# Patient Record
Sex: MALE | Race: WHITE | NOT HISPANIC OR LATINO
[De-identification: names, ages, dates, MRNs, and addresses within clinical notes are randomized per-mention and may not be internally consistent; named-entity substitution may affect disease eponyms.]

---

## 2017-02-26 PROBLEM — Z00.00 ENCOUNTER FOR PREVENTIVE HEALTH EXAMINATION: Status: ACTIVE | Noted: 2017-02-26

## 2017-04-05 ENCOUNTER — APPOINTMENT (OUTPATIENT)
Dept: UROLOGY | Facility: CLINIC | Age: 67
End: 2017-04-05

## 2020-03-13 ENCOUNTER — APPOINTMENT (OUTPATIENT)
Dept: UROLOGY | Facility: CLINIC | Age: 70
End: 2020-03-13
Payer: MEDICARE

## 2020-03-13 VITALS
WEIGHT: 210 LBS | HEART RATE: 108 BPM | HEIGHT: 67 IN | SYSTOLIC BLOOD PRESSURE: 139 MMHG | BODY MASS INDEX: 32.96 KG/M2 | DIASTOLIC BLOOD PRESSURE: 88 MMHG

## 2020-03-13 DIAGNOSIS — Z78.9 OTHER SPECIFIED HEALTH STATUS: ICD-10-CM

## 2020-03-13 DIAGNOSIS — K46.9 UNSPECIFIED ABDOMINAL HERNIA W/OUT OBSTRUCTION OR GANGRENE: ICD-10-CM

## 2020-03-13 PROCEDURE — 52000 CYSTOURETHROSCOPY: CPT

## 2020-03-13 PROCEDURE — 99215 OFFICE O/P EST HI 40 MIN: CPT | Mod: 25

## 2020-03-13 PROCEDURE — 51700 IRRIGATION OF BLADDER: CPT | Mod: 59

## 2020-03-13 PROCEDURE — 51798 US URINE CAPACITY MEASURE: CPT

## 2020-03-13 NOTE — ASSESSMENT
[FreeTextEntry1] : Physical exam shows that his resting tremor is worse. His bladder is palpable penis without discharge but there is blood on his depends testicular exam was soft and nontender. Bladder scan showed over 500 ML I tried to pass first 20 [place] than a 14 [place] coudé catheter met resistance in the region of the bladder neck. I don’t know if this is a false passage she did with his catheter or he has bladder neck contracture so we elected to go to cystoscopy please see that note for specifics.\par \par \par \par Cystoscopy showed a false passage has well bladder neck contracture under vision with difficulty I got by the false passage is able to get the scope into the bladder up a wire down. I taper the end of a coudé catheter and over the wire is able to force it in getting in a 22 feeling it dilate the tissue. I.e. the inflatable and took out the wire and to get about 600 mL of grossly blood urine. He was irrigated until cleared taking about a liter to do that and he was left with a catheter to a leg back.\par \par It’s going to go home will see where we does over the weekend I’ve explained to my partner who is covering for the week and what happened and that there’s a problem we may have to admit him. I will see him on Monday and discuss further options including checking his prostate. He will probably repeat cystoscopy though this may be done in an operating room in case I feel I need to do an incision of his bladder neck and/or section of his prostate.\par

## 2020-03-13 NOTE — LETTER BODY
[Dear  ___] : Dear  [unfilled], [Consult Letter:] : I had the pleasure of evaluating your patient, [unfilled]. [Please see my note below.] : Please see my note below. [Consult Closing:] : Thank you very much for allowing me to participate in the care of this patient.  If you have any questions, please do not hesitate to contact me. [Sincerely,] : Sincerely, [FreeTextEntry2] : Davon Zarate MD\par [address]par [place] [address]\par

## 2020-03-13 NOTE — PHYSICAL EXAM
[General Appearance - Well Developed] : well developed [General Appearance - Well Nourished] : well nourished [Normal Appearance] : normal appearance [Well Groomed] : well groomed [Heart Rate And Rhythm] : Heart rate and rhythm were normal [Respiration, Rhythm And Depth] : normal respiratory rhythm and effort [Exaggerated Use Of Accessory Muscles For Inspiration] : no accessory muscle use [Auscultation Breath Sounds / Voice Sounds] : lungs were clear to auscultation bilaterally [Abdomen Soft] : soft [Abdomen Tenderness] : non-tender [Abdomen Hernia] : no hernia was discovered [Costovertebral Angle Tenderness] : no ~M costovertebral angle tenderness [Urethral Meatus] : meatus normal [] : no rash [Oriented To Time, Place, And Person] : oriented to person, place, and time [Affect] : the affect was normal [Mood] : the mood was normal [Not Anxious] : not anxious [FreeTextEntry1] : cant explain not calling until now other than wife with serious illness and he has been ignoring all else

## 2020-03-13 NOTE — END OF VISIT
[>50% of Time Spent on Counseling for ____] : Greater than 50% of the encounter time was spent on counseling for [unfilled] [Time Spent: ___ minutes] : I have spent [unfilled] minutes of face to face time with the patient EP study with possible intervention/Other (specify)

## 2020-03-13 NOTE — HISTORY OF PRESENT ILLNESS
[FreeTextEntry1] : Nam was last seen April 5, 2017 just under three years ago. At that point he was voiding well using sildenafil his testosterone was normal though the free and bioavailable were low normal and he was supposed to come back as if needed. 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 Wood he felt it could be watched. In that going for a greenlight laser in July 2012 and was voiding well since then. At the time of the laser he was told that there may be some sloughing for the 6 to 12 weeks afterwards and he’d had that and then he done well. Sometime the end of December he voided out some sort of particular matter but then started voiding at his baseline two weeks ago he had trouble voiding pushed out what he says was a small piece of particulate matter he again said he then started voiding well and a few days ago he started having increasing trouble urinating. He had tried putting in a catheter got out some particulate matter and said he was able to void he tried putting in the catheter today and got out nothing. He called got service he was referred to Dr. Wood who referred the call to me as I was coming in I contacted him and told him to come in. [Urinary Retention] : urinary retention

## 2020-03-13 NOTE — LETTER HEADER
[FreeTextEntry3] : Chuyita Daly M.D.\par Director of Urology\par [facility]\par [address] [place] [address], [address]\[place] [address]

## 2020-03-16 ENCOUNTER — APPOINTMENT (OUTPATIENT)
Dept: UROLOGY | Facility: CLINIC | Age: 70
End: 2020-03-16
Payer: MEDICARE

## 2020-03-16 VITALS
BODY MASS INDEX: 32.96 KG/M2 | HEIGHT: 67 IN | TEMPERATURE: 98.3 F | DIASTOLIC BLOOD PRESSURE: 74 MMHG | WEIGHT: 210 LBS | SYSTOLIC BLOOD PRESSURE: 126 MMHG | HEART RATE: 104 BPM

## 2020-03-16 PROCEDURE — 99215 OFFICE O/P EST HI 40 MIN: CPT | Mod: 25

## 2020-03-16 PROCEDURE — 51700 IRRIGATION OF BLADDER: CPT

## 2020-03-16 PROCEDURE — 81003 URINALYSIS AUTO W/O SCOPE: CPT | Mod: QW

## 2020-03-16 NOTE — LETTER BODY
[Dear  ___] : Dear  [unfilled], [Courtesy Letter:] : I had the pleasure of seeing your patient, [unfilled], in my office today. [Please see my note below.] : Please see my note below. [Sincerely,] : Sincerely, [FreeTextEntry2] : Davon Zarate MD\par [address]par [place] [address]\par

## 2020-03-16 NOTE — ASSESSMENT
[FreeTextEntry1] : Physical exam showed some irritation around the urethra but no signs of infection testicular exam was benign and the urine the clear.\par \par The bladder was irrigated a few tiny clots were removed with the ring it was clear. Please note though he held over 500 mL on Friday trying to put in more than 50 mL led to him having spasms with your leaking out around the catheter.\par \par The catheter was removed was sent outside (because of corona we are not having people sit in the office) and came back a short time later. He voided in his pants with severe urge incontinence. We scanned him and he had 75 mL in his bladder. We had him wait at this point he was able to control well enough to give us a sample we will send it for urinalysis and culture. We sukhdev a BMP and CBC and when the results come back Wednesday we will decide if he can get a CT urogram to see if his bleeding was from something else or UTI. If he has trouble voiding he will contact us if he’s able to void well enough will see what the CT urogram shows and then decide if this is new one set of bladder outlet obstruction several years post a laser prostatectomy or this is something else

## 2020-03-16 NOTE — LETTER HEADER
[FreeTextEntry3] : Chuyita Daly M.D.\par Director of Urology\par [facility]\par [address] [place] [address], [address]\[place] [address]

## 2020-03-16 NOTE — HISTORY OF PRESENT ILLNESS
[FreeTextEntry1] : Nam was seen on Friday is an emergency appointment. He was to be retention with clots with a false passage in the urethra. With difficulty using a flexible cystoscope and wire catheter was put in and manipulated into the bladder. Over the week and he seen a few clots but the urine has been clearing. He has been having some spasms with urine leaking out around the catheter

## 2020-03-18 LAB
ANION GAP SERPL CALC-SCNC: 15 MMOL/L
BASOPHILS # BLD AUTO: 0.04 K/UL
BASOPHILS NFR BLD AUTO: 0.2 %
BUN SERPL-MCNC: 15 MG/DL
CALCIUM SERPL-MCNC: 9.1 MG/DL
CHLORIDE SERPL-SCNC: 97 MMOL/L
CO2 SERPL-SCNC: 23 MMOL/L
CREAT SERPL-MCNC: 0.7 MG/DL
EOSINOPHIL # BLD AUTO: 0.02 K/UL
EOSINOPHIL NFR BLD AUTO: 0.1 %
GLUCOSE SERPL-MCNC: 79 MG/DL
HCT VFR BLD CALC: 45.2 %
HGB BLD-MCNC: 14.6 G/DL
IMM GRANULOCYTES NFR BLD AUTO: 0.3 %
LYMPHOCYTES # BLD AUTO: 1.47 K/UL
LYMPHOCYTES NFR BLD AUTO: 7.2 %
MAN DIFF?: NORMAL
MCHC RBC-ENTMCNC: 31.1 PG
MCHC RBC-ENTMCNC: 32.3 G/DL
MCV RBC AUTO: 96.4 FL
MONOCYTES # BLD AUTO: 1.41 K/UL
MONOCYTES NFR BLD AUTO: 6.9 %
NEUTROPHILS # BLD AUTO: 17.32 K/UL
NEUTROPHILS NFR BLD AUTO: 85.3 %
PLATELET # BLD AUTO: 238 K/UL
POTASSIUM SERPL-SCNC: 4.5 MMOL/L
RBC # BLD: 4.69 M/UL
RBC # FLD: 13.5 %
SODIUM SERPL-SCNC: 135 MMOL/L
WBC # FLD AUTO: 20.33 K/UL

## 2020-05-27 ENCOUNTER — APPOINTMENT (OUTPATIENT)
Dept: UROLOGY | Facility: CLINIC | Age: 70
End: 2020-05-27
Payer: MEDICARE

## 2020-05-27 VITALS
WEIGHT: 215 LBS | HEIGHT: 67 IN | DIASTOLIC BLOOD PRESSURE: 72 MMHG | BODY MASS INDEX: 33.74 KG/M2 | HEART RATE: 82 BPM | SYSTOLIC BLOOD PRESSURE: 126 MMHG | TEMPERATURE: 96.5 F

## 2020-05-27 DIAGNOSIS — Z87.448 PERSONAL HISTORY OF OTHER DISEASES OF URINARY SYSTEM: ICD-10-CM

## 2020-05-27 DIAGNOSIS — R33.8 OTHER RETENTION OF URINE: ICD-10-CM

## 2020-05-27 DIAGNOSIS — N36.5 URETHRAL FALSE PASSAGE: ICD-10-CM

## 2020-05-27 DIAGNOSIS — N32.0 BLADDER-NECK OBSTRUCTION: ICD-10-CM

## 2020-05-27 PROCEDURE — 99214 OFFICE O/P EST MOD 30 MIN: CPT

## 2020-05-27 RX ORDER — CEFUROXIME AXETIL 500 MG/1
500 TABLET ORAL
Qty: 14 | Refills: 0 | Status: COMPLETED | COMMUNITY
Start: 2020-03-20 | End: 2020-05-27

## 2020-05-28 LAB
APPEARANCE: CLEAR
BILIRUBIN URINE: NEGATIVE
BLOOD URINE: NEGATIVE
COLOR: COLORLESS
GLUCOSE QUALITATIVE U: NEGATIVE
KETONES URINE: NEGATIVE
LEUKOCYTE ESTERASE URINE: NEGATIVE
NITRITE URINE: NEGATIVE
PH URINE: 7.5
PROTEIN URINE: NEGATIVE
SPECIFIC GRAVITY URINE: 1.01
URINE CYTOLOGY: NORMAL
UROBILINOGEN URINE: NORMAL

## 2020-05-31 LAB — BACTERIA UR CULT: NORMAL

## 2020-06-02 PROBLEM — N32.0 BLADDER NECK CONTRACTURE: Status: RESOLVED | Noted: 2020-03-13 | Resolved: 2020-06-02

## 2020-06-02 PROBLEM — N36.5 FALSE PASSAGE OF URETHRA: Status: RESOLVED | Noted: 2020-03-13 | Resolved: 2020-06-02

## 2020-06-02 PROBLEM — R33.8 ACUTE URINARY RETENTION: Status: RESOLVED | Noted: 2020-03-13 | Resolved: 2020-06-02

## 2020-06-02 NOTE — LETTER BODY
[Courtesy Letter:] : I had the pleasure of seeing your patient, [unfilled], in my office today. [Dear  ___] : Dear  [unfilled], [Please see my note below.] : Please see my note below. [Sincerely,] : Sincerely, [FreeTextEntry2] : Davon Zarate MD\par [address]par [place] [address]\par

## 2020-06-02 NOTE — PHYSICAL EXAM
[General Appearance - Well Developed] : well developed [Normal Appearance] : normal appearance [General Appearance - Well Nourished] : well nourished [Well Groomed] : well groomed [Abdomen Soft] : soft [Abdomen Tenderness] : non-tender [Costovertebral Angle Tenderness] : no ~M costovertebral angle tenderness [Abdomen Hernia] : no hernia was discovered [Penis Abnormality] : normal circumcised penis [Urethral Meatus] : meatus normal [Testes Mass (___cm)] : there were no testicular masses [Testes Tenderness] : no tenderness of the testes [Heart Rate And Rhythm] : Heart rate and rhythm were normal [Auscultation Breath Sounds / Voice Sounds] : lungs were clear to auscultation bilaterally [Respiration, Rhythm And Depth] : normal respiratory rhythm and effort [] : no respiratory distress [Exaggerated Use Of Accessory Muscles For Inspiration] : no accessory muscle use [Inguinal Lymph Nodes Enlarged Bilaterally] : inguinal [FreeTextEntry1] : resting tremors

## 2020-06-02 NOTE — HISTORY OF PRESENT ILLNESS
[FreeTextEntry1] : Mr. Armenta has been seen back on March 16 with retention Gross hematuria and was found to have a UTI he had given himself for his passage and with a flexible cystoscope you are able to get a catheter in. He was eventually put on so if your oxime was post get a repeat culture by March 24 he was having a few clouds but he was voiding well without problems. He comes in today telling us that he stop the caffeine significantly increased his hydration feels he is voiding as well or better than he has in a long time except for the fact that is waking up multiple times at night presumed secondary to the large volumes. [Nocturia] : nocturia

## 2020-06-02 NOTE — LETTER HEADER
[FreeTextEntry3] : Chuyita Daly M.D.\par Director of Urology\par [facility]\par [address] [place] [address], [address]\[place] [address]

## 2020-06-02 NOTE — ASSESSMENT
[FreeTextEntry1] : Though he is not empty completely he is emptying what I think is well enough I don’t know if this is boarding sentence from regrowth of his prostate versus his acknowledged increased uptake. He will keep a record of his intake and output and we will see what happens. With respect to the CAT scan he had on March 30 he had bilateral fullness but he will get me the disc so I can review it myself and we will get a urinalysis and culture. He will follow up either by telemedicine the office if we need a uroflow we will get them in one way or the other.

## 2020-08-06 ENCOUNTER — APPOINTMENT (OUTPATIENT)
Dept: UROLOGY | Facility: CLINIC | Age: 70
End: 2020-08-06
Payer: MEDICARE

## 2020-08-06 PROCEDURE — 99215 OFFICE O/P EST HI 40 MIN: CPT | Mod: 95

## 2020-08-06 NOTE — HISTORY OF PRESENT ILLNESS
[Home] : at home, [unfilled] , at the time of the visit. [Medical Office: ([facility])___] : at the medical office located in  [FreeTextEntry3] : he has received, reviewed and agreed to the telemedicine consent  [Family Member] : family member [Urinary Urgency] : urinary urgency [FreeTextEntry1] : I communicated with him by his cell phone at [phone] and any email communications will be sent to [email or web address]\par \par Nam was seen for gross total painless hematuria and voiding dysfunction back in March 2020 he was seen in follow-up on May 27, 2020 where a study showed he was not empty completely but I felt that he was emptying well enough. He’d had a CAT scan done on March 30 that was read as bilateral fullness but I never got the disc and he finally got that to us. I also wanted a repeat urinalysis and culture along with a record of his input and output. The record was sent in, please see below, the urinalysis and culture were done on May 27 please see below\par  [Urinary Frequency] : urinary frequency [Nocturia] : nocturia [Hematuria - Gross] : no gross hematuria

## 2020-08-06 NOTE — LETTER HEADER
[FreeTextEntry3] : Chuyita Daly M.D.\par Director of Urology\par [facility]\par [address] [place] [address], [address]\[place] [address]

## 2020-08-06 NOTE — ASSESSMENT
[FreeTextEntry1] : He is not urinating well and it is likely that he has some element of bladder outlet obstruction. I have recommended he start do all therapy with Flomax to relax any muscular layers within his prostate and given the size of his prostate, also start him on Proscar knowing that could affect his libido and in some people induce the lack of pleasurable orgasm. \par \par If we can’t get him voiding better enough I am going to want to do urodynamics as if he has such a large prostate and is not voiding well it may be high-pressure poor voiding. If the urodynamics indeed demonstrates that scenario then it is likely that he’ll need his prostate treated. If the urodynamics demonstrates low pressure poor voiding then it’s up to him how he wants to live. However again before we do invasive testing I’d like to try noninvasive i.e. medical treatment\par

## 2020-08-06 NOTE — LETTER BODY
[Dear  ___] : Dear  [unfilled], [Courtesy Letter:] : I had the pleasure of seeing your patient, [unfilled], in my office today. [Please see my note below.] : Please see my note below. [Sincerely,] : Sincerely, [FreeTextEntry2] : Davon Zarate MD\par [address] [address]\par

## 2020-11-11 ENCOUNTER — APPOINTMENT (OUTPATIENT)
Dept: UROLOGY | Facility: CLINIC | Age: 70
End: 2020-11-11
Payer: MEDICARE

## 2020-11-11 VITALS
TEMPERATURE: 97.6 F | HEART RATE: 95 BPM | HEIGHT: 67 IN | DIASTOLIC BLOOD PRESSURE: 72 MMHG | SYSTOLIC BLOOD PRESSURE: 134 MMHG | BODY MASS INDEX: 33.43 KG/M2 | WEIGHT: 213 LBS

## 2020-11-11 PROCEDURE — 51741 ELECTRO-UROFLOWMETRY FIRST: CPT

## 2020-11-11 PROCEDURE — 99214 OFFICE O/P EST MOD 30 MIN: CPT | Mod: 25

## 2020-11-11 PROCEDURE — 51798 US URINE CAPACITY MEASURE: CPT

## 2020-11-11 NOTE — LETTER HEADER
[FreeTextEntry3] : Chuyita Daly M.D.\par Director of Urology\par [facility]\par [address] [place] [address], [address]\[place] [address]

## 2020-11-11 NOTE — PHYSICAL EXAM
[General Appearance - Well Developed] : well developed [General Appearance - Well Nourished] : well nourished [Normal Appearance] : normal appearance [Well Groomed] : well groomed [Abdomen Soft] : soft [Abdomen Tenderness] : non-tender [Abdomen Hernia] : no hernia was discovered [Costovertebral Angle Tenderness] : no ~M costovertebral angle tenderness [Heart Rate And Rhythm] : Heart rate and rhythm were normal [] : no respiratory distress [Respiration, Rhythm And Depth] : normal respiratory rhythm and effort [Exaggerated Use Of Accessory Muscles For Inspiration] : no accessory muscle use [Oriented To Time, Place, And Person] : oriented to person, place, and time [Affect] : the affect was normal [Mood] : the mood was normal [Not Anxious] : not anxious [FreeTextEntry1] : resting tremors

## 2020-11-11 NOTE — REVIEW OF SYSTEMS
[see HPI] : see HPI [Erectile Dysfunction] : erectile dysfunction [Negative] : Heme/Lymph [FreeTextEntry1] : hand tremors

## 2020-11-11 NOTE — ASSESSMENT
[FreeTextEntry1] : His flow is quite poor and he has resting upper extremity tremors though from what he understands and tells me this reportedly is not affected bladder outlet. His prostate has shrunk in size by half if we assume both modalities are accurate but even with that he still severely symptomatic. However he’s only been on the Proscar three months and his prostate continues to shrink his symptoms make it better enough that he may not need surgery. In addition he still has a risk of retention but given that is post would residual is what it is I think the chance that he’ll going to retention in the absence of something them I precipitated such as an infection or bleeding is low and I feel acceptable. We discussed his options and he would like to stay on the Proscar another three months and if he is not voiding well enough then will consider urodynamics. Routine or video in consideration for a repeat prostatectomy if we think that will help\par \par As far as erections will stay with sildenafil when his wife is able to participate

## 2020-11-11 NOTE — HISTORY OF PRESENT ILLNESS
[Urinary Urgency] : urinary urgency [Urinary Frequency] : urinary frequency [Nocturia] : nocturia [FreeTextEntry1] : Author is several we have been following for years including the previous green light laser done by Dr. Zhong. In the not-too-distant past he ended up going into retention requiring the catheter for a while which was difficult to pass. He has been put on Proscar because a CAT scan showed a very large prostate while he has been continued on tamsulosin. He is also been taking sildenafil to help his erections though lately his wife has been having her own issues so that been that sexually active. Please note he has a tremor which is not Parkinson’s and he is been told by the neurologist is not an attention from her and is not something that should be affecting his voiding\par \par He tells me that is by three months of the medication especially the proscribed as and when we see any change in his low urinary tract symptoms and he has significant frequency especially if he starts hydrating sonic his wife wants him to do she does not want to end up in clot retention again.\par \par He emailed me a copy of his intake and output and gone for a renal bladder ultrasound and the record was quite poor so we went for a flow please see that result listed separately [Erectile Dysfunction] : Erectile Dysfunction [Hematuria - Gross] : no gross hematuria

## 2020-11-11 NOTE — LETTER BODY
[Dear  ___] : Dear  [unfilled], [Courtesy Letter:] : I had the pleasure of seeing your patient, [unfilled], in my office today. [Please see my note below.] : Please see my note below. [Sincerely,] : Sincerely, [FreeTextEntry2] : Davon Zarate MD\par [address]par [place] [address]\par

## 2021-03-22 ENCOUNTER — APPOINTMENT (OUTPATIENT)
Dept: UROLOGY | Facility: CLINIC | Age: 71
End: 2021-03-22
Payer: MEDICARE

## 2021-03-22 VITALS
SYSTOLIC BLOOD PRESSURE: 140 MMHG | DIASTOLIC BLOOD PRESSURE: 78 MMHG | WEIGHT: 220 LBS | HEIGHT: 67 IN | HEART RATE: 92 BPM | TEMPERATURE: 97.9 F | BODY MASS INDEX: 34.53 KG/M2

## 2021-03-22 DIAGNOSIS — N40.0 BENIGN PROSTATIC HYPERPLASIA WITHOUT LOWER URINARY TRACT SYMPMS: ICD-10-CM

## 2021-03-22 DIAGNOSIS — R39.198 OTHER DIFFICULTIES WITH MICTURITION: ICD-10-CM

## 2021-03-22 PROCEDURE — 99214 OFFICE O/P EST MOD 30 MIN: CPT

## 2021-03-22 NOTE — PHYSICAL EXAM
[General Appearance - Well Developed] : well developed [General Appearance - Well Nourished] : well nourished [Normal Appearance] : normal appearance [Well Groomed] : well groomed [General Appearance - In No Acute Distress] : no acute distress [] : no respiratory distress [Respiration, Rhythm And Depth] : normal respiratory rhythm and effort [Exaggerated Use Of Accessory Muscles For Inspiration] : no accessory muscle use [Oriented To Time, Place, And Person] : oriented to person, place, and time [Affect] : the affect was normal [Mood] : the mood was normal [Not Anxious] : not anxious [FreeTextEntry1] : Resting tremor

## 2021-03-22 NOTE — LETTER HEADER
[FreeTextEntry3] : Chuyita Daly M.D.\par Director of Urology\par [facility]\par [address] [place] [address], [address]\[place] [address]

## 2021-03-22 NOTE — LETTER BODY
[Dear  ___] : Dear  [unfilled], [Courtesy Letter:] : I had the pleasure of seeing your patient, [unfilled], in my office today. [Please see my note below.] : Please see my note below. [Sincerely,] : Sincerely, [FreeTextEntry2] : Davon Zarate MD\par [address]par [place] [address]\par

## 2021-03-22 NOTE — HISTORY OF PRESENT ILLNESS
[Urinary Urgency] : urinary urgency [Urinary Frequency] : urinary frequency [Nocturia] : nocturia [Erectile Dysfunction] : Erectile Dysfunction [FreeTextEntry1] : Yolanda is a 70-year-old male who we have been following for bothersome lower urinary tract symptoms. He status post green light laser done by  in the past. He unfortunately in 2020, he  went urinary retention had a difficult Lora catheter placed. A CT scan demonstrated large prostate.  The catheter was eventually removed and he has been urinating on his own on tamsulosin p.o. q. daily.  However he has not been voiding well.  He has other items that are stressing him and that may be contributing to this but he like to know what we can do about it from a urologic aspect.  He presents today to review his renal/bladder ultrasound, voiding diary, and to discuss further evaluation.\par \par He has erectile dysfunction that is not an issue of great concern at today's visit [Hematuria - Gross] : no gross hematuria

## 2021-03-22 NOTE — ASSESSMENT
[FreeTextEntry1] : Renal/bladder ultrasound demonstrated significant large prostate at 81 g. He has a resting tremor and he is not voiding well. \par \par We discussed all the options and is electing to undergo video urodynamics for further evaluation of his voiding dysfunction. We discussed having this done out in [place] for this test however, he wishes to wait until we obtained the equipment here, which hopefully will be  sometimes in July. \par \par \par After he went out to the waiting area and his son helped him get ready to leave he decided that this really is a problem and he would like it done sooner.  We sent his phone number to Dr. Willoughby out at [facility].  His phone number [phone] and requested that they arrange for telemedicine visit to discuss the issues and then if he agrees arrange for video urodynamics I explained to author the reason we want a video set of regular urodynamics is because of his muscular tremors which might  not be picked up on a regular urodynamics well enough to differentiated from bladder outlet mainly prostate or bladder neck

## 2021-04-22 ENCOUNTER — APPOINTMENT (OUTPATIENT)
Dept: UROLOGY | Facility: CLINIC | Age: 71
End: 2021-04-22
Payer: MEDICARE

## 2021-04-22 VITALS
SYSTOLIC BLOOD PRESSURE: 133 MMHG | RESPIRATION RATE: 16 BRPM | HEART RATE: 86 BPM | HEIGHT: 67 IN | BODY MASS INDEX: 34.53 KG/M2 | WEIGHT: 220 LBS | DIASTOLIC BLOOD PRESSURE: 81 MMHG | TEMPERATURE: 97.3 F

## 2021-04-22 DIAGNOSIS — R39.15 URGENCY OF URINATION: ICD-10-CM

## 2021-04-22 DIAGNOSIS — R35.0 FREQUENCY OF MICTURITION: ICD-10-CM

## 2021-04-22 PROCEDURE — 51798 US URINE CAPACITY MEASURE: CPT

## 2021-04-22 PROCEDURE — 99215 OFFICE O/P EST HI 40 MIN: CPT

## 2021-04-22 NOTE — ASSESSMENT
[FreeTextEntry1] : Plan:\par \par - Urinalysis & urine culture sent (call for results)\par \par - PVR: 8 mL\par \par -  VUDS with cysto in office with Dr. Willoughby (appt 7/22/21)\par \par \par NOCTURIA\par - Discussed leg elevation 1-1.5 hours before bed on 2 pillows\par \par - Start trial of Flomax 0.8mg PO QHS\par \par -  We discussed first line therapies for OAB, including lifestyle modifications.\par \par - We discussed second line therapies for OAB including medications from the anti-cholinergic and B3 agonist categories. Side effects from each category were reviewed.\par \par - We also discussed third line therapies for OAB, including tibial nerve stimulation, intravesical bladder Botox, and sacral neuromodulation.\par \par - Start trial of Myrbetriq 25mg PO QD\par Patient was prescribed Myrbetriq. We discussed the side effects including headache, flushing, hypertension, rhinorrhea, and palpitations. Patient also understood that the medication would be costly if insurance did not approve it.\par \par RTC for VUDS

## 2021-04-22 NOTE — REVIEW OF SYSTEMS
[see HPI] : see HPI [Incontinence] : incontinence [Nocturia] : nocturia [Negative] : Psychiatric [Constipation] : no constipation

## 2021-04-22 NOTE — LETTER BODY
[Dear  ___] : Dear  [unfilled], [Consult Letter:] : I had the pleasure of evaluating your patient, [unfilled]. [Please see my note below.] : Please see my note below. [Consult Closing:] : Thank you very much for allowing me to participate in the care of this patient.  If you have any questions, please do not hesitate to contact me. [Sincerely,] : Sincerely, [FreeTextEntry1] : We had a productive visit.\par We will have the patient scheduled for next available Video Urodynamics appointment as you suggested, which will be in July.\par Before that time, we will trial the patient on Myrbetriq and Tamsulosin.\par His PVR today was 8 ml.  [FreeTextEntry3] : Dr. Gunnar Willoughby\par Urology\par Voiding Dysfunction, Female Pelvic Medicine, & Reconstructive Surgery\par

## 2021-04-22 NOTE — HISTORY OF PRESENT ILLNESS
[FreeTextEntry1] : 71 yr old male patient presents to clinic, accompanied by Son (Vic) for BPH with LUTS. Referred by Dr. Daly. \par \par Patient reports having the urinary symptoms of urgency and frequency for almost ten years now. During the day, pt has the urge to go to the bathroom almost every 1 hour and nocturia 3-4x, wears adult diaper if needs to travel far. + dribbling. Denies dysuria/hematuria.  Denies stress urinary incontinence. \par \par Daily fluid intake includes: 25-40 oz of water, no coffee, no juice. 1 cup of tea every week. \par No constipation - daily BM. \par \par past surgeries: \par Laser: 2003\par Hernia Repair: 2007\par Laminectomy 2011 \par Green light laser: 2013\par \par Social hx: never smoker \par \par PVR today: 8mL

## 2021-04-23 LAB
APPEARANCE: CLEAR
BACTERIA: NEGATIVE
BILIRUBIN URINE: NEGATIVE
BLOOD URINE: NORMAL
COLOR: NORMAL
GLUCOSE QUALITATIVE U: NEGATIVE
HYALINE CASTS: 0 /LPF
KETONES URINE: NEGATIVE
LEUKOCYTE ESTERASE URINE: NEGATIVE
MICROSCOPIC-UA: NORMAL
NITRITE URINE: NEGATIVE
PH URINE: 6
PROTEIN URINE: NEGATIVE
RED BLOOD CELLS URINE: 5 /HPF
SPECIFIC GRAVITY URINE: 1.02
SQUAMOUS EPITHELIAL CELLS: 0 /HPF
UROBILINOGEN URINE: NORMAL
WHITE BLOOD CELLS URINE: 1 /HPF

## 2021-04-26 LAB — BACTERIA UR CULT: NORMAL

## 2021-04-30 RX ORDER — MIRABEGRON 25 MG/1
25 TABLET, FILM COATED, EXTENDED RELEASE ORAL
Qty: 30 | Refills: 3 | Status: COMPLETED | COMMUNITY
Start: 2021-04-22 | End: 2021-04-30

## 2021-07-22 ENCOUNTER — OUTPATIENT (OUTPATIENT)
Dept: OUTPATIENT SERVICES | Facility: HOSPITAL | Age: 71
LOS: 1 days | End: 2021-07-22
Payer: MEDICARE

## 2021-07-22 ENCOUNTER — APPOINTMENT (OUTPATIENT)
Dept: UROLOGY | Facility: CLINIC | Age: 71
End: 2021-07-22
Payer: MEDICARE

## 2021-07-22 ENCOUNTER — APPOINTMENT (OUTPATIENT)
Dept: UROLOGY | Facility: CLINIC | Age: 71
End: 2021-07-22

## 2021-07-22 VITALS
TEMPERATURE: 97.1 F | HEART RATE: 96 BPM | RESPIRATION RATE: 17 BRPM | DIASTOLIC BLOOD PRESSURE: 89 MMHG | SYSTOLIC BLOOD PRESSURE: 141 MMHG

## 2021-07-22 DIAGNOSIS — N32.81 OVERACTIVE BLADDER: ICD-10-CM

## 2021-07-22 DIAGNOSIS — N39.41 URGE INCONTINENCE: ICD-10-CM

## 2021-07-22 DIAGNOSIS — R25.1 TREMOR, UNSPECIFIED: ICD-10-CM

## 2021-07-22 DIAGNOSIS — N40.1 BENIGN PROSTATIC HYPERPLASIA WITH LOWER URINARY TRACT SYMPTOMS: ICD-10-CM

## 2021-07-22 DIAGNOSIS — R35.0 FREQUENCY OF MICTURITION: ICD-10-CM

## 2021-07-22 PROCEDURE — 51741 ELECTRO-UROFLOWMETRY FIRST: CPT

## 2021-07-22 PROCEDURE — 51728 CYSTOMETROGRAM W/VP: CPT | Mod: 26

## 2021-07-22 PROCEDURE — 76000 FLUOROSCOPY <1 HR PHYS/QHP: CPT | Mod: 25

## 2021-07-22 PROCEDURE — 51797 INTRAABDOMINAL PRESSURE TEST: CPT

## 2021-07-22 PROCEDURE — 51784 ANAL/URINARY MUSCLE STUDY: CPT | Mod: 26

## 2021-07-22 PROCEDURE — 51600 INJECTION FOR BLADDER X-RAY: CPT

## 2021-07-22 PROCEDURE — 51741 ELECTRO-UROFLOWMETRY FIRST: CPT | Mod: 26

## 2021-07-22 PROCEDURE — 52000 CYSTOURETHROSCOPY: CPT

## 2021-07-22 PROCEDURE — 76000 FLUOROSCOPY <1 HR PHYS/QHP: CPT | Mod: 26,59

## 2021-07-22 PROCEDURE — 51797 INTRAABDOMINAL PRESSURE TEST: CPT | Mod: 26

## 2021-07-22 PROCEDURE — 51728 CYSTOMETROGRAM W/VP: CPT

## 2021-07-22 PROCEDURE — 51784 ANAL/URINARY MUSCLE STUDY: CPT

## 2021-08-02 ENCOUNTER — NON-APPOINTMENT (OUTPATIENT)
Age: 71
End: 2021-08-02

## 2021-08-03 ENCOUNTER — APPOINTMENT (OUTPATIENT)
Dept: UROLOGY | Facility: CLINIC | Age: 71
End: 2021-08-03

## 2021-08-06 ENCOUNTER — RX RENEWAL (OUTPATIENT)
Age: 71
End: 2021-08-06

## 2021-08-06 RX ORDER — TAMSULOSIN HYDROCHLORIDE 0.4 MG/1
0.4 CAPSULE ORAL
Qty: 90 | Refills: 3 | Status: DISCONTINUED | COMMUNITY
Start: 2020-08-06 | End: 2021-08-06

## 2021-09-30 ENCOUNTER — OUTPATIENT (OUTPATIENT)
Dept: OUTPATIENT SERVICES | Facility: HOSPITAL | Age: 71
LOS: 1 days | End: 2021-09-30
Payer: MEDICARE

## 2021-09-30 VITALS
DIASTOLIC BLOOD PRESSURE: 88 MMHG | TEMPERATURE: 98 F | RESPIRATION RATE: 20 BRPM | HEART RATE: 94 BPM | HEIGHT: 66 IN | SYSTOLIC BLOOD PRESSURE: 126 MMHG | OXYGEN SATURATION: 96 % | WEIGHT: 214.95 LBS

## 2021-09-30 DIAGNOSIS — K40.20 BILATERAL INGUINAL HERNIA, WITHOUT OBSTRUCTION OR GANGRENE, NOT SPECIFIED AS RECURRENT: Chronic | ICD-10-CM

## 2021-09-30 DIAGNOSIS — Z01.818 ENCOUNTER FOR OTHER PREPROCEDURAL EXAMINATION: ICD-10-CM

## 2021-09-30 DIAGNOSIS — Z98.890 OTHER SPECIFIED POSTPROCEDURAL STATES: Chronic | ICD-10-CM

## 2021-09-30 DIAGNOSIS — R35.1 NOCTURIA: ICD-10-CM

## 2021-09-30 DIAGNOSIS — N40.0 BENIGN PROSTATIC HYPERPLASIA WITHOUT LOWER URINARY TRACT SYMPTOMS: ICD-10-CM

## 2021-09-30 DIAGNOSIS — M43.22 FUSION OF SPINE, CERVICAL REGION: Chronic | ICD-10-CM

## 2021-09-30 DIAGNOSIS — N40.1 BENIGN PROSTATIC HYPERPLASIA WITH LOWER URINARY TRACT SYMPTOMS: ICD-10-CM

## 2021-09-30 DIAGNOSIS — R35.0 FREQUENCY OF MICTURITION: ICD-10-CM

## 2021-09-30 LAB
ANION GAP SERPL CALC-SCNC: 14 MMOL/L — SIGNIFICANT CHANGE UP (ref 5–17)
BLD GP AB SCN SERPL QL: NEGATIVE — SIGNIFICANT CHANGE UP
BUN SERPL-MCNC: 12 MG/DL — SIGNIFICANT CHANGE UP (ref 7–23)
CALCIUM SERPL-MCNC: 9.3 MG/DL — SIGNIFICANT CHANGE UP (ref 8.4–10.5)
CHLORIDE SERPL-SCNC: 102 MMOL/L — SIGNIFICANT CHANGE UP (ref 96–108)
CO2 SERPL-SCNC: 21 MMOL/L — LOW (ref 22–31)
CREAT SERPL-MCNC: 0.71 MG/DL — SIGNIFICANT CHANGE UP (ref 0.5–1.3)
GLUCOSE SERPL-MCNC: 101 MG/DL — HIGH (ref 70–99)
HCT VFR BLD CALC: 46.4 % — SIGNIFICANT CHANGE UP (ref 39–50)
HGB BLD-MCNC: 15.5 G/DL — SIGNIFICANT CHANGE UP (ref 13–17)
MCHC RBC-ENTMCNC: 30.2 PG — SIGNIFICANT CHANGE UP (ref 27–34)
MCHC RBC-ENTMCNC: 33.4 GM/DL — SIGNIFICANT CHANGE UP (ref 32–36)
MCV RBC AUTO: 90.3 FL — SIGNIFICANT CHANGE UP (ref 80–100)
NRBC # BLD: 0 /100 WBCS — SIGNIFICANT CHANGE UP (ref 0–0)
PLATELET # BLD AUTO: 220 K/UL — SIGNIFICANT CHANGE UP (ref 150–400)
POTASSIUM SERPL-MCNC: 4 MMOL/L — SIGNIFICANT CHANGE UP (ref 3.5–5.3)
POTASSIUM SERPL-SCNC: 4 MMOL/L — SIGNIFICANT CHANGE UP (ref 3.5–5.3)
RBC # BLD: 5.14 M/UL — SIGNIFICANT CHANGE UP (ref 4.2–5.8)
RBC # FLD: 13.2 % — SIGNIFICANT CHANGE UP (ref 10.3–14.5)
RH IG SCN BLD-IMP: POSITIVE — SIGNIFICANT CHANGE UP
SODIUM SERPL-SCNC: 137 MMOL/L — SIGNIFICANT CHANGE UP (ref 135–145)
WBC # BLD: 8.14 K/UL — SIGNIFICANT CHANGE UP (ref 3.8–10.5)
WBC # FLD AUTO: 8.14 K/UL — SIGNIFICANT CHANGE UP (ref 3.8–10.5)

## 2021-09-30 PROCEDURE — 85027 COMPLETE CBC AUTOMATED: CPT

## 2021-09-30 PROCEDURE — 87086 URINE CULTURE/COLONY COUNT: CPT

## 2021-09-30 PROCEDURE — 86850 RBC ANTIBODY SCREEN: CPT

## 2021-09-30 PROCEDURE — 86900 BLOOD TYPING SEROLOGIC ABO: CPT

## 2021-09-30 PROCEDURE — 86901 BLOOD TYPING SEROLOGIC RH(D): CPT

## 2021-09-30 PROCEDURE — 80048 BASIC METABOLIC PNL TOTAL CA: CPT

## 2021-09-30 PROCEDURE — G0463: CPT

## 2021-09-30 RX ORDER — CEFAZOLIN SODIUM 1 G
2000 VIAL (EA) INJECTION ONCE
Refills: 0 | Status: DISCONTINUED | OUTPATIENT
Start: 2021-10-05 | End: 2021-10-19

## 2021-09-30 NOTE — H&P PST ADULT - HISTORY OF PRESENT ILLNESS
72 y/o Male BPH with LUTS ( lower urinary tract symptoms), Patient reports having the urinary symptoms of urgency and frequency for almost ten years now. During the day, pt has the urge to go to the bathroom almost every 1 hour and nocturia 3-4x, wears adult diaper if needs to travel far. + dribbling. Denies dysuria/hematuria. Denies stress urinary incontinence. Referred by Dr. Daly. & now for elective cystoscopy & TURP on 10/5/21.     70 y/o Male BPH with LUTS ( lower urinary tract symptoms), Patient reports having the urinary symptoms of urgency and frequency for almost ten years now. During the day, pt has the urge to go to the bathroom almost every 1 hour and nocturia 3-4x, wears adult diaper if needs to travel far. + dribbling. Denies dysuria/hematuria. Denies stress urinary incontinence. Referred by Dr. Daly. & now for elective cystoscopy & TURP on 10/5/21.    COVID @ [facility] on 10/3

## 2021-09-30 NOTE — H&P PST ADULT - NSICDXPASTSURGICALHX_GEN_ALL_CORE_FT
PAST SURGICAL HISTORY:  Bilateral inguinal hernia     Cervical vertebral fusion anterior  neck    H/O colonoscopy     H/O laminectomy

## 2021-10-01 LAB
CULTURE RESULTS: SIGNIFICANT CHANGE UP
SPECIMEN SOURCE: SIGNIFICANT CHANGE UP

## 2021-10-03 ENCOUNTER — OUTPATIENT (OUTPATIENT)
Dept: OUTPATIENT SERVICES | Facility: HOSPITAL | Age: 71
LOS: 1 days | End: 2021-10-03
Payer: MEDICARE

## 2021-10-03 DIAGNOSIS — Z98.890 OTHER SPECIFIED POSTPROCEDURAL STATES: Chronic | ICD-10-CM

## 2021-10-03 DIAGNOSIS — M43.22 FUSION OF SPINE, CERVICAL REGION: Chronic | ICD-10-CM

## 2021-10-03 DIAGNOSIS — K40.20 BILATERAL INGUINAL HERNIA, WITHOUT OBSTRUCTION OR GANGRENE, NOT SPECIFIED AS RECURRENT: Chronic | ICD-10-CM

## 2021-10-03 DIAGNOSIS — Z11.52 ENCOUNTER FOR SCREENING FOR COVID-19: ICD-10-CM

## 2021-10-03 LAB — SARS-COV-2 RNA SPEC QL NAA+PROBE: SIGNIFICANT CHANGE UP

## 2021-10-03 PROCEDURE — U0003: CPT

## 2021-10-03 PROCEDURE — U0005: CPT

## 2021-10-03 PROCEDURE — C9803: CPT

## 2021-10-04 ENCOUNTER — TRANSCRIPTION ENCOUNTER (OUTPATIENT)
Age: 71
End: 2021-10-04

## 2021-10-05 ENCOUNTER — APPOINTMENT (OUTPATIENT)
Dept: UROLOGY | Facility: HOSPITAL | Age: 71
End: 2021-10-05

## 2021-10-05 ENCOUNTER — OUTPATIENT (OUTPATIENT)
Dept: OUTPATIENT SERVICES | Facility: HOSPITAL | Age: 71
LOS: 1 days | End: 2021-10-05
Payer: MEDICARE

## 2021-10-05 ENCOUNTER — RESULT REVIEW (OUTPATIENT)
Age: 71
End: 2021-10-05

## 2021-10-05 VITALS
SYSTOLIC BLOOD PRESSURE: 142 MMHG | OXYGEN SATURATION: 96 % | RESPIRATION RATE: 18 BRPM | HEART RATE: 99 BPM | TEMPERATURE: 100 F | DIASTOLIC BLOOD PRESSURE: 82 MMHG | WEIGHT: 214.95 LBS | HEIGHT: 66 IN

## 2021-10-05 VITALS
SYSTOLIC BLOOD PRESSURE: 127 MMHG | RESPIRATION RATE: 18 BRPM | HEART RATE: 85 BPM | OXYGEN SATURATION: 100 % | DIASTOLIC BLOOD PRESSURE: 77 MMHG

## 2021-10-05 DIAGNOSIS — Z98.890 OTHER SPECIFIED POSTPROCEDURAL STATES: Chronic | ICD-10-CM

## 2021-10-05 DIAGNOSIS — R35.0 FREQUENCY OF MICTURITION: ICD-10-CM

## 2021-10-05 DIAGNOSIS — N40.1 BENIGN PROSTATIC HYPERPLASIA WITH LOWER URINARY TRACT SYMPTOMS: ICD-10-CM

## 2021-10-05 DIAGNOSIS — K40.20 BILATERAL INGUINAL HERNIA, WITHOUT OBSTRUCTION OR GANGRENE, NOT SPECIFIED AS RECURRENT: Chronic | ICD-10-CM

## 2021-10-05 DIAGNOSIS — R35.1 NOCTURIA: ICD-10-CM

## 2021-10-05 DIAGNOSIS — Z01.818 ENCOUNTER FOR OTHER PREPROCEDURAL EXAMINATION: ICD-10-CM

## 2021-10-05 DIAGNOSIS — M43.22 FUSION OF SPINE, CERVICAL REGION: Chronic | ICD-10-CM

## 2021-10-05 PROBLEM — N40.0 BENIGN PROSTATIC HYPERPLASIA WITHOUT LOWER URINARY TRACT SYMPTOMS: Chronic | Status: ACTIVE | Noted: 2021-09-30

## 2021-10-05 PROBLEM — E66.9 OBESITY, UNSPECIFIED: Chronic | Status: ACTIVE | Noted: 2021-09-30

## 2021-10-05 PROBLEM — N32.81 OVERACTIVE BLADDER: Chronic | Status: ACTIVE | Noted: 2021-09-30

## 2021-10-05 LAB — RH IG SCN BLD-IMP: POSITIVE — SIGNIFICANT CHANGE UP

## 2021-10-05 PROCEDURE — 88305 TISSUE EXAM BY PATHOLOGIST: CPT

## 2021-10-05 PROCEDURE — 88305 TISSUE EXAM BY PATHOLOGIST: CPT | Mod: 26

## 2021-10-05 PROCEDURE — C9399: CPT

## 2021-10-05 PROCEDURE — 52601 PROSTATECTOMY (TURP): CPT

## 2021-10-05 RX ORDER — HYDROMORPHONE HYDROCHLORIDE 2 MG/ML
0.5 INJECTION INTRAMUSCULAR; INTRAVENOUS; SUBCUTANEOUS
Refills: 0 | Status: DISCONTINUED | OUTPATIENT
Start: 2021-10-05 | End: 2021-10-05

## 2021-10-05 RX ORDER — MECLIZINE HCL 12.5 MG
1 TABLET ORAL
Qty: 0 | Refills: 0 | DISCHARGE

## 2021-10-05 RX ORDER — SODIUM CHLORIDE 9 MG/ML
1000 INJECTION, SOLUTION INTRAVENOUS
Refills: 0 | Status: DISCONTINUED | OUTPATIENT
Start: 2021-10-05 | End: 2021-10-19

## 2021-10-05 RX ORDER — SODIUM CHLORIDE 9 MG/ML
3 INJECTION INTRAMUSCULAR; INTRAVENOUS; SUBCUTANEOUS EVERY 8 HOURS
Refills: 0 | Status: DISCONTINUED | OUTPATIENT
Start: 2021-10-05 | End: 2021-10-05

## 2021-10-05 RX ORDER — TROSPIUM CHLORIDE 20 MG/1
1 TABLET, FILM COATED ORAL
Qty: 0 | Refills: 0 | DISCHARGE

## 2021-10-05 RX ORDER — TAMSULOSIN HYDROCHLORIDE 0.4 MG/1
2 CAPSULE ORAL
Qty: 0 | Refills: 0 | DISCHARGE

## 2021-10-05 RX ORDER — FINASTERIDE 5 MG/1
1 TABLET, FILM COATED ORAL
Qty: 0 | Refills: 0 | DISCHARGE

## 2021-10-05 RX ORDER — HYDROMORPHONE HYDROCHLORIDE 2 MG/ML
1 INJECTION INTRAMUSCULAR; INTRAVENOUS; SUBCUTANEOUS
Refills: 0 | Status: DISCONTINUED | OUTPATIENT
Start: 2021-10-05 | End: 2021-10-05

## 2021-10-05 RX ADMIN — SODIUM CHLORIDE 125 MILLILITER(S): 9 INJECTION, SOLUTION INTRAVENOUS at 14:58

## 2021-10-05 NOTE — ASU DISCHARGE PLAN (ADULT/PEDIATRIC) - ASU DC SPECIAL INSTRUCTIONSFT
Follow up in office tomorrow for removal of barahona  catheter.  drink a lot of fluids. monitor urine output. IF Bar[place]ona Catheter STOPS draining >3 hours Please call office immediately.  Next dose of Tylenol 500 mg every 6 hours as needed is due 6:30 pm tonight

## 2021-10-05 NOTE — ASU DISCHARGE PLAN (ADULT/PEDIATRIC) - PROVIDER TOKENS
PROVIDER:[TOKEN:[[id number]],FOLLOWUP:[1-3 days],ESTABLISHEDPATIENT:[T]]
(4) no limitation
IV discontinued, cath removed intact

## 2021-10-05 NOTE — ASU DISCHARGE PLAN (ADULT/PEDIATRIC) - COMMENTS
Please drink plenty of fluids.  If the catheter stops draining for > 3 hours, please call the office.  You will go to the office tomorrow for catheter removal and then follow up with Dr. Willoughby in 6 weeks.

## 2021-10-05 NOTE — BRIEF OPERATIVE NOTE - NSICDXBRIEFPOSTOP_GEN_ALL_CORE_FT
POST-OP DIAGNOSIS:  BPH with obstruction/lower urinary tract symptoms 05-Oct-2021 13:44:31  Darrin Mays

## 2021-10-05 NOTE — ASU DISCHARGE PLAN (ADULT/PEDIATRIC) - CALL YOUR DOCTOR IF YOU HAVE ANY OF THE FOLLOWING:
Fever greater than (need to indicate Fahrenheit or Celsius)/Nausea and vomiting that does not stop Fever greater than (need to indicate Fahrenheit or Celsius)/Nausea and vomiting that does not stop/Inability to tolerate liquids or foods

## 2021-10-05 NOTE — ASU DISCHARGE PLAN (ADULT/PEDIATRIC) - CARE PROVIDER_API CALL
Gunnar Willoughby)  Urology  [address] [place] [address]  Phone: [phone]  Fax: [phone]  Established Patient  Follow Up Time: 1-3 days

## 2021-10-05 NOTE — ASU DISCHARGE PLAN (ADULT/PEDIATRIC) - NURSING INSTRUCTIONS
******************************************************************************************  Next dose of TYLENOL may be taken at  6:30___ PM if needed. DO NOT take any additional products containing TYLENOL or ACETAMINOPHEN, such as VICODIN, PERCOCET, NORCO, EXCEDRIN, and any over-the-counter cold medications until this time. DO NOT CONSUME MORE THAN 7481-6309 MG of TYLENOL (acetaminophen) in a 24-hour period.

## 2021-10-06 ENCOUNTER — APPOINTMENT (OUTPATIENT)
Dept: UROLOGY | Facility: CLINIC | Age: 71
End: 2021-10-06
Payer: MEDICARE

## 2021-10-06 VITALS
SYSTOLIC BLOOD PRESSURE: 125 MMHG | OXYGEN SATURATION: 98 % | HEART RATE: 93 BPM | HEIGHT: 67 IN | DIASTOLIC BLOOD PRESSURE: 79 MMHG | RESPIRATION RATE: 17 BRPM | WEIGHT: 220 LBS | BODY MASS INDEX: 34.53 KG/M2 | TEMPERATURE: 98 F

## 2021-10-06 DIAGNOSIS — R42 DIZZINESS AND GIDDINESS: ICD-10-CM

## 2021-10-06 DIAGNOSIS — R33.9 RETENTION OF URINE, UNSPECIFIED: ICD-10-CM

## 2021-10-06 PROCEDURE — 99024 POSTOP FOLLOW-UP VISIT: CPT

## 2021-10-06 PROCEDURE — 51798 US URINE CAPACITY MEASURE: CPT

## 2021-10-06 NOTE — ASSESSMENT
[FreeTextEntry1] : 72 yo gentleman s/p TURP 10/5/21 and here today for TOV NP  Idania instilled  120   cc sterile water via 3 way catheter # 22  , tolerated , removed post deflation, tolerated , pt leaked some and then voided .PVR 0  ml.Call office if any voiding issues and or fever/chills/hematuria,N/V. RTO in a month .

## 2021-10-06 NOTE — HISTORY OF PRESENT ILLNESS
[FreeTextEntry1] : 70 yo gentleman s/p TURP 10/5/21 and here today for TOV NP  Idania instilled  120   cc sterile water via 3 way catheter # 22  , tolerated , removed post deflation, tolerated , pt leaked some and then voided .PVR 0  ml.Call office if any voiding issues and or fever/chills/hematuria,N/V. RTO in a month .

## 2021-10-12 LAB — SURGICAL PATHOLOGY STUDY: SIGNIFICANT CHANGE UP

## 2021-11-11 ENCOUNTER — APPOINTMENT (OUTPATIENT)
Dept: UROLOGY | Facility: CLINIC | Age: 71
End: 2021-11-11
Payer: MEDICARE

## 2021-11-11 VITALS
BODY MASS INDEX: 33.74 KG/M2 | RESPIRATION RATE: 17 BRPM | DIASTOLIC BLOOD PRESSURE: 70 MMHG | WEIGHT: 215 LBS | TEMPERATURE: 98.7 F | HEIGHT: 67 IN | SYSTOLIC BLOOD PRESSURE: 113 MMHG | HEART RATE: 94 BPM

## 2021-11-11 DIAGNOSIS — R25.1 TREMOR, UNSPECIFIED: ICD-10-CM

## 2021-11-11 DIAGNOSIS — N39.41 URGE INCONTINENCE: ICD-10-CM

## 2021-11-11 DIAGNOSIS — R35.1 NOCTURIA: ICD-10-CM

## 2021-11-11 PROCEDURE — 99024 POSTOP FOLLOW-UP VISIT: CPT

## 2021-11-11 RX ORDER — SILDENAFIL 25 MG/1
25 TABLET ORAL
Refills: 0 | Status: COMPLETED | COMMUNITY
End: 2021-11-11

## 2021-11-11 NOTE — END OF VISIT
[FreeTextEntry3] : Patient satisfied with surgery outcome\par Will d/c Trospium right away\par After 1 month if patient still doing well, will cut Tamsulosin from 0.8 to 0.4 mg\par \par rto 1 year\par \par \par The total time spent with the patient includes face to face time as well as time for documentation, ordering medications/labs/procedures, and care coordination, but I acknowledge it does not include time spent on any procedures performed (eg PVR, UDS, Cystoscopy, catheter changes, etc).\par \par I, Dr. Willoughby, personally performed the evaluation and management (E/M) services for this established patient who presents today with (a) new problem(s)/exacerbation of existing conditio.  That E/M includes conducting the examination, assessing all new/exacerbated conditions, and establishing a new plan of care.  Today, my ACP, Noris Funez NP, was here to observe my evaluation and management services for this new problem/exacerbated condition to be followed going forward.\par  [Time Spent: ___ minutes] : I have spent [unfilled] minutes of time on the encounter.

## 2021-11-11 NOTE — ASSESSMENT
[FreeTextEntry1] : Plan:\par \par - U/A and culture sent - will call if result is positive \par \par - PVR today:\par \par - Con't Tamsulosin and Trospium \par \par - RTO in 1 yr

## 2021-11-11 NOTE — HISTORY OF PRESENT ILLNESS
[FreeTextEntry1] : 71 yr old male patient presents to office today for post op follow up of TURP from 10/5. accompanied by son (Vic). \par \par patient is very satisfy with the surgery, and since the surgery, patient noticed the urine flow is much better and he feels he is emptying his bladder much better. \par \par Less getting up at night for urination. Daytime frequency had also improved. No need to strain to urinate. \par \par Patient wears pull-ups only for long distance travels. Reports occasional urge incontinence. \par \par Denies constipation, denies dysuria and hematuria. \par \par \par \par \par PVR today = 34 cc (voided 30 minutes prior to random bladder scan)

## 2022-11-17 ENCOUNTER — APPOINTMENT (OUTPATIENT)
Dept: UROLOGY | Facility: CLINIC | Age: 72
End: 2022-11-17

## 2022-11-17 VITALS
WEIGHT: 228 LBS | HEIGHT: 67 IN | BODY MASS INDEX: 35.79 KG/M2 | TEMPERATURE: 98.9 F | DIASTOLIC BLOOD PRESSURE: 77 MMHG | HEART RATE: 86 BPM | SYSTOLIC BLOOD PRESSURE: 134 MMHG | RESPIRATION RATE: 17 BRPM

## 2022-11-17 DIAGNOSIS — N52.9 MALE ERECTILE DYSFUNCTION, UNSPECIFIED: ICD-10-CM

## 2022-11-17 DIAGNOSIS — N40.1 BENIGN PROSTATIC HYPERPLASIA WITH LOWER URINARY TRACT SYMPMS: ICD-10-CM

## 2022-11-17 DIAGNOSIS — N13.8 BENIGN PROSTATIC HYPERPLASIA WITH LOWER URINARY TRACT SYMPMS: ICD-10-CM

## 2022-11-17 PROCEDURE — 99214 OFFICE O/P EST MOD 30 MIN: CPT

## 2022-11-17 RX ORDER — TROSPIUM CHLORIDE 20 MG/1
20 TABLET, FILM COATED ORAL
Qty: 90 | Refills: 1 | Status: DISCONTINUED | COMMUNITY
Start: 2021-04-30 | End: 2022-11-17

## 2022-11-17 RX ORDER — FINASTERIDE 5 MG/1
5 TABLET, FILM COATED ORAL DAILY
Qty: 90 | Refills: 1 | Status: DISCONTINUED | COMMUNITY
Start: 2020-08-06 | End: 2022-11-17

## 2022-11-17 RX ORDER — TAMSULOSIN HYDROCHLORIDE 0.4 MG/1
0.4 CAPSULE ORAL
Qty: 180 | Refills: 2 | Status: DISCONTINUED | COMMUNITY
Start: 2021-04-22 | End: 2022-11-17

## 2022-11-17 RX ORDER — TADALAFIL 10 MG/1
10 TABLET, FILM COATED ORAL
Qty: 10 | Refills: 11 | Status: ACTIVE | COMMUNITY
Start: 2022-11-17 | End: 1900-01-01

## 2022-11-17 NOTE — END OF VISIT
[FreeTextEntry3] :  Time includes reviewing the chart prior to visit, documentation, and correspondence.\par  [Time Spent: ___ minutes] : I have spent [unfilled] minutes of time on the encounter.

## 2022-11-17 NOTE — PHYSICAL EXAM
[General Appearance - Well Developed] : well developed [General Appearance - In No Acute Distress] : no acute distress [] : no respiratory distress [Abdomen Tenderness] : non-tender [Normal Station and Gait] : the gait and station were normal for the patient's age

## 2022-11-17 NOTE — HISTORY OF PRESENT ILLNESS
[FreeTextEntry1] : 72YOM s/p TURP 10/5/21 here for 1 year followup \par d/c finasteride to see if ED would improve\par d/c tamsulosin \par \par urinary frequency 2-3 hours, nocturiax1-2\par some urgency\par denies stress incontinence\par urinary stream good\par denies dysuria, denies hematuria\par \par has problems with erections refractory to viagra, rx by PCP\par \par

## 2022-11-17 NOTE — ASSESSMENT
[FreeTextEntry1] : counseled re s/p TURP, d/c finasteride \par if symptoms of BPH return we can resume tamsulosin \par \par ED\par  re viagra and needing empty stomach\par \par rx tadalafil  - take in AM >2 hours prior to intercourse\par take viagra 30 min before\par \par \par he would like a referral to a sexual specialist, will refer to see Dr Chatterjee\par \par \par

## 2022-11-18 RX ORDER — PAPAVER/PHENTOLAMINE/ALPROSTAD 150-5-50
150-5-50 VIAL (EA) INTRACAVERNOSAL
Qty: 1 | Refills: 1 | Status: ACTIVE | COMMUNITY
Start: 2022-11-18 | End: 1900-01-01

## 2023-01-03 NOTE — PHYSICAL EXAM
[Normal Appearance] : normal appearance [Abdomen Soft] : soft [Skin Color & Pigmentation] : normal skin color and pigmentation [Not Anxious] : not anxious [No Focal Deficits] : no focal deficits actual [General Appearance - Well Developed] : well developed [] : no rash [Normal Station and Gait] : the gait and station were normal for the patient's age [Urinary Bladder Findings] : the bladder was normal on palpation [FreeTextEntry1] : +1  edema on lower legs.

## 2023-11-30 ENCOUNTER — APPOINTMENT (OUTPATIENT)
Dept: UROLOGY | Facility: CLINIC | Age: 73
End: 2023-11-30

## 2024-09-13 NOTE — ASU DISCHARGE PLAN (ADULT/PEDIATRIC) - CONDITION AT DISCHARGE
Fax received 9- from [place] to be completed by provider.    Put in Dr. Adria Covarrubias)   Stable